# Patient Record
Sex: MALE | Race: WHITE
[De-identification: names, ages, dates, MRNs, and addresses within clinical notes are randomized per-mention and may not be internally consistent; named-entity substitution may affect disease eponyms.]

---

## 2019-07-29 ENCOUNTER — HOSPITAL ENCOUNTER (EMERGENCY)
Dept: HOSPITAL 63 - ER | Age: 39
Discharge: HOME | End: 2019-07-29
Payer: COMMERCIAL

## 2019-07-29 VITALS — BODY MASS INDEX: 23.61 KG/M2 | WEIGHT: 164.91 LBS | HEIGHT: 70 IN

## 2019-07-29 VITALS — DIASTOLIC BLOOD PRESSURE: 65 MMHG | SYSTOLIC BLOOD PRESSURE: 148 MMHG
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
## 2019-07-29 DIAGNOSIS — S83.004A: Primary | ICD-10-CM
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
## 2019-07-29 PROCEDURE — 73564 X-RAY EXAM KNEE 4 OR MORE: CPT

## 2019-07-29 PROCEDURE — 96374 THER/PROPH/DIAG INJ IV PUSH: CPT

## 2019-07-29 PROCEDURE — 29505 APPLICATION LONG LEG SPLINT: CPT

## 2019-07-29 PROCEDURE — 96375 TX/PRO/DX INJ NEW DRUG ADDON: CPT

## 2019-07-29 PROCEDURE — 27560 TREAT KNEECAP DISLOCATION: CPT

## 2019-07-29 PROCEDURE — 99284 EMERGENCY DEPT VISIT MOD MDM: CPT

## 2019-07-29 RX ADMIN — ONDANSETRON ONE MG: 2 INJECTION INTRAMUSCULAR; INTRAVENOUS at 10:48

## 2019-07-29 RX ADMIN — HYDROMORPHONE HYDROCHLORIDE ONE MG: 1 INJECTION, SOLUTION INTRAMUSCULAR; INTRAVENOUS; SUBCUTANEOUS at 10:50

## 2019-07-29 NOTE — PHYS DOC
Adult General


Chief Complaint


Chief Complaint:  KNEE INJURY





HPI


HPI


38-year-old male presents with a dislocated right patella. He was working and a 

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 The patient was climbing out of a 

forklift when his knee rubbed against the fender of the machine and his kneecap 

shifted laterally and got stuck. He would not go back in place. The patient has 

had multiple partial dislocations where it seems to go out of the groove but 

comes back on its own. Patient denies any other injuries or complaints.





Review of Systems


Review of Systems





Constitutional: Denies fever or chills []


Eyes: Denies change in visual acuity, redness, or eye pain []


HENT: Denies nasal congestion or sore throat []


Respiratory: Denies cough or shortness of breath []


Cardiovascular: No additional information not addressed in HPI []


GI: Denies abdominal pain, nausea, vomiting, bloody stools or diarrhea []


: Denies dysuria or hematuria []


Musculoskeletal: Dislocated right patella[]


Integument: Denies rash or skin lesions []


Neurologic: Denies headache, focal weakness or sensory changes []


Endocrine: Denies polyuria or polydipsia []





All other systems were reviewed and found to be within normal limits, except as 

documented in this note.





Physical Exam


Physical Exam





Constitutional: Well developed, well nourished, no acute distress, non-toxic 

appearance. []


HENT: Normocephalic, atraumatic, bilateral external ears normal, oropharynx 

moist, no oral exudates, nose normal. []


Eyes: PERRLA, EOMI, conjunctiva normal, no discharge. [] 


Neck: Normal range of motion, no tenderness, supple, no stridor. [] 


Cardiovascular:Heart rate regular rhythm, no murmur []


Lungs & Thorax:  Bilateral breath sounds clear to auscultation []


Abdomen: Bowel sounds normal, soft, no tenderness, no masses, no pulsatile 

masses. [] 


Skin: Warm, dry, no erythema, no rash. [] 


Back: No tenderness, no CVA tenderness. [] 


Extremities: Right patella is dislocated laterally[] 


Neurologic: Alert and oriented X 3, normal motor function, normal sensory 

function, no focal deficits noted. []


Psychologic: Affect normal, judgement normal, mood normal. []





EKG


EKG


[]





Radiology/Procedures


Radiology/Procedures


[]





Course & Med Decision Making


Course & Med Decision Making


Pertinent Labs and Imaging studies reviewed. (See chart for details)


The patient was given 4 mg of Zofran, 2 mg of Ativan, and 1 mg of Dilaudid all 

by IV. This allowed the patient enough pain control and relaxation for the 

relocation procedure. I applied medial pressure on the lateral edge of the 

patella passively extended the right knee joint. The patella relocated as 

expected. There was no significant difficulty or complication. Post reduction 

films are pending. The patient's post reduction films are within normal limits. 

No fracture seen. The patient is stable for discharge at this time.


[]





Dragon Disclaimer


Dragon Disclaimer


This electronic medical record was generated, in whole or in part, using a voice

 recognition dictation system.





Departure


Departure:


Impression:  


   Primary Impression:  


   Dislocation of patella, right, closed


Disposition:  01 HOME, SELF-CARE


Condition:  STABLE


Patient Instructions:  Patellar Dislocation and Subluxation with Phase I Rehab-

SportsMed





Additional Instructions:  


You should wear the knee immobilizer we gave you in the emergency room until you

 get a specific knee brace for the kneecap (patella). You should wear this brace

 for 4 weeks. You should start the rehab exercises that I gave you in one week 

and continue them for at least 4 weeks. Use 600 mg of ibuprofen 3 times a day as

 needed for pain and swelling.





Problem Qualifiers








   Primary Impression:  


   Dislocation of patella, right, closed


   Encounter type:  initial encounter  Qualified Codes:  S83.004A - Unspecified 

   dislocation of right patella, initial encounter








MECCA GERBER DO                 Jul 29, 2019 10:51

## 2019-07-29 NOTE — RAD
Exam performed: 3 views right knee.

 

HISTORY: Post reduction right patella.

 

DATE OF SERVICE: 7/29/2019.

 

COMPARISON: None available

 

FINDINGS:

 

AP, lateral and sunrise view of the right knee are obtained. Normal 

alignment of the medial and lateral tibiofemoral and patellofemoral joint 

compartment is preserved. There is no acute fracture or dislocation. There

is soft tissue swelling with small suprapatellar joint effusion.

 

IMPRESSION:

 

Mild soft tissue swelling and small joint effusion

No acute bony abnormality seen in the right knee.

 

Electronically signed by: Janice Cormier MD (7/29/2019 11:23 AM) [facility]